# Patient Record
Sex: MALE | Race: WHITE | NOT HISPANIC OR LATINO | Employment: OTHER
[De-identification: names, ages, dates, MRNs, and addresses within clinical notes are randomized per-mention and may not be internally consistent; named-entity substitution may affect disease eponyms.]

---

## 2022-11-08 ENCOUNTER — DASHBOARD ENCOUNTERS (OUTPATIENT)
Age: 82
End: 2022-11-08

## 2022-11-08 ENCOUNTER — OFFICE VISIT (OUTPATIENT)
Dept: URBAN - METROPOLITAN AREA CLINIC 68 | Facility: CLINIC | Age: 82
End: 2022-11-08

## 2022-11-08 RX ORDER — AMOXICILLIN AND CLAVULANATE POTASSIUM 875; 125 MG/1; MG/1
1 TABLET TABLET, FILM COATED ORAL
Qty: 20 | OUTPATIENT
Start: 2022-11-08 | End: 2022-11-18

## 2022-11-08 RX ORDER — PIOGLITAZONE 30 MG/1
1 TABLET TABLET ORAL ONCE A DAY
Status: ACTIVE | COMMUNITY

## 2022-11-08 RX ORDER — NEBIVOLOL HYDROCHLORIDE 10 MG/1
1 TABLET TABLET ORAL ONCE A DAY
Status: ACTIVE | COMMUNITY

## 2022-11-08 RX ORDER — NIACIN 750 MG
1 TABLET WITH FOOD TABLET, EXTENDED RELEASE ORAL ONCE A DAY
Status: ACTIVE | COMMUNITY

## 2022-11-08 RX ORDER — SOD SULF/POT CHLORIDE/MAG SULF 1.479 G
24 TABLETS AS DIRECTED TABLET ORAL ONCE
Qty: 24 TABLET | Refills: 0 | OUTPATIENT
Start: 2022-11-08 | End: 2022-11-09

## 2022-11-08 RX ORDER — AMLODIPINE BESYLATE 5 MG/1
1 TABLET TABLET ORAL ONCE A DAY
Status: ACTIVE | COMMUNITY

## 2022-11-08 RX ORDER — ALLOPURINOL 300 MG/1
1 TABLET TABLET ORAL ONCE A DAY
Status: ACTIVE | COMMUNITY

## 2022-11-08 RX ORDER — METFORMIN HYDROCHLORIDE 500 MG/1
3 TABLETS TABLET, EXTENDED RELEASE ORAL ONCE A DAY
Status: ACTIVE | COMMUNITY

## 2022-11-08 RX ORDER — TAMSULOSIN HYDROCHLORIDE 0.4 MG/1
1 CAPSULE CAPSULE ORAL ONCE A DAY
Status: ACTIVE | COMMUNITY

## 2022-11-08 RX ORDER — SITAGLIPTIN 100 MG/1
1 TABLET TABLET, FILM COATED ORAL ONCE A DAY
Status: ACTIVE | COMMUNITY

## 2022-11-08 RX ORDER — L-METHYLFOLATE-ALGAE-VIT B12-B6 CAP 3-90.314-2-35 MG 3-90.314-2-35 MG
AS DIRECTED CAP ORAL
Status: ACTIVE | COMMUNITY

## 2022-11-08 NOTE — HPI-MIGRATED HPI
General : Planned colonoscopy for colon cancer screen cancelled due to A fib, [place]  Last colonoscopy 13 yrs ago Urgent care 10 days ago for pain and presumed diverticulitis, better after Augmentin    in the process of cardiac evaluation, Dr Martinez   for possible arrythmia SALTY Gonzalez arranging cardiac eval pending   Pt had diverticulitis April antibiotics  7 days of antibiotics at the time Pt has ongoing intermittent Loose watery or formed stool s , bleeding in January  ER for diverticulitis 3 yrs ago  attacks  April 2022 and 2019

## 2022-11-08 NOTE — EXAM-MIGRATED EXAMINATIONS
General Examination: General appearance: -> alert, pleasant, well-nourished and in no acute distress   Head: -> normocephalic, atraumatic   Eyes: -> pupils equal, round, reactive to light and accommodation   Ears: -> normal   Nose: -> no lesions   Oral cavity: -> normal , mucosa moist , tongue is midline , with good dentition   Chest: -> chest wall with no costochondral junction tenderness, no rib deformity and normal shape and expansion   Abdomen: -> soft with good bowel sounds, nontender, and no masses or hepatosplenomegaly   Rectal: -> not examined   Musculoskeletal: -> no swelling, redness, warmth or tenderness of the shoulder(s) with full range of motion   Extremities: -> normal extremity with no clubbing, cyanosis or edema   Neurologic: -> nonfocal , alert and oriented   Throat: -> clear   Neck / thyroid: -> neck is supple, with full range of motion and no cervical lymphadenopathy , no masses and/or tenderness , no jugular venous distention , trachea midline   Lymph nodes: -> no axillary, supraclavicular or inguinal lymphadenopathy   Skin: -> skin is warm and dry, with no rashes, good skin turgor and normal hair distribution   Heart: -> irregular  rate and rhythm without murmurs, gallops, clicks or rubs   Lungs: -> clear to auscultation bilaterally, with good air movement and no rales, rhonchi or wheezes

## 2022-12-05 ENCOUNTER — TELEPHONE ENCOUNTER (OUTPATIENT)
Dept: URBAN - METROPOLITAN AREA CLINIC 68 | Facility: CLINIC | Age: 82
End: 2022-12-05

## 2022-12-29 ENCOUNTER — OFFICE VISIT (OUTPATIENT)
Dept: URBAN - METROPOLITAN AREA MEDICAL CENTER 21 | Facility: MEDICAL CENTER | Age: 82
End: 2022-12-29